# Patient Record
Sex: MALE | Race: WHITE | NOT HISPANIC OR LATINO
[De-identification: names, ages, dates, MRNs, and addresses within clinical notes are randomized per-mention and may not be internally consistent; named-entity substitution may affect disease eponyms.]

---

## 2017-02-21 ENCOUNTER — APPOINTMENT (OUTPATIENT)
Dept: OTOLARYNGOLOGY | Facility: CLINIC | Age: 72
End: 2017-02-21

## 2017-02-21 VITALS
OXYGEN SATURATION: 98 % | TEMPERATURE: 98.1 F | SYSTOLIC BLOOD PRESSURE: 110 MMHG | DIASTOLIC BLOOD PRESSURE: 72 MMHG | HEART RATE: 74 BPM

## 2017-02-21 DIAGNOSIS — J36 PERITONSILLAR ABSCESS: ICD-10-CM

## 2017-05-08 ENCOUNTER — LABORATORY RESULT (OUTPATIENT)
Age: 72
End: 2017-05-08

## 2017-05-08 ENCOUNTER — APPOINTMENT (OUTPATIENT)
Dept: OTOLARYNGOLOGY | Facility: CLINIC | Age: 72
End: 2017-05-08

## 2017-05-08 VITALS
SYSTOLIC BLOOD PRESSURE: 117 MMHG | DIASTOLIC BLOOD PRESSURE: 71 MMHG | HEART RATE: 83 BPM | TEMPERATURE: 99 F | OXYGEN SATURATION: 98 %

## 2017-05-08 DIAGNOSIS — J31.0 CHRONIC RHINITIS: ICD-10-CM

## 2017-05-08 DIAGNOSIS — Z87.09 PERSONAL HISTORY OF OTHER DISEASES OF THE RESPIRATORY SYSTEM: ICD-10-CM

## 2017-05-08 DIAGNOSIS — R05 COUGH: ICD-10-CM

## 2017-05-08 RX ORDER — FLUTICASONE PROPIONATE 50 UG/1
50 SPRAY, METERED NASAL
Qty: 1 | Refills: 3 | Status: ACTIVE | COMMUNITY
Start: 2017-05-08 | End: 1900-01-01

## 2017-05-08 RX ORDER — CALCIUM CARBONATE/VITAMIN D3 600MG-62.5
CAPSULE ORAL
Qty: 2 | Refills: 0 | Status: ACTIVE | COMMUNITY
Start: 2017-05-08 | End: 1900-01-01

## 2017-05-09 ENCOUNTER — LABORATORY RESULT (OUTPATIENT)
Age: 72
End: 2017-05-09

## 2017-11-30 ENCOUNTER — APPOINTMENT (OUTPATIENT)
Dept: OTOLARYNGOLOGY | Facility: CLINIC | Age: 72
End: 2017-11-30
Payer: MEDICARE

## 2017-11-30 VITALS
OXYGEN SATURATION: 99 % | SYSTOLIC BLOOD PRESSURE: 119 MMHG | DIASTOLIC BLOOD PRESSURE: 74 MMHG | HEART RATE: 44 BPM | TEMPERATURE: 98.2 F

## 2017-11-30 PROCEDURE — 69210 REMOVE IMPACTED EAR WAX UNI: CPT

## 2017-11-30 PROCEDURE — 99214 OFFICE O/P EST MOD 30 MIN: CPT | Mod: 25

## 2017-11-30 PROCEDURE — 31575 DIAGNOSTIC LARYNGOSCOPY: CPT

## 2018-02-26 ENCOUNTER — APPOINTMENT (OUTPATIENT)
Dept: OTOLARYNGOLOGY | Facility: CLINIC | Age: 73
End: 2018-02-26

## 2018-03-26 ENCOUNTER — APPOINTMENT (OUTPATIENT)
Dept: OTOLARYNGOLOGY | Facility: CLINIC | Age: 73
End: 2018-03-26
Payer: MEDICARE

## 2018-03-26 VITALS
TEMPERATURE: 97.9 F | SYSTOLIC BLOOD PRESSURE: 120 MMHG | DIASTOLIC BLOOD PRESSURE: 77 MMHG | OXYGEN SATURATION: 100 % | HEART RATE: 61 BPM

## 2018-03-26 VITALS
OXYGEN SATURATION: 100 % | TEMPERATURE: 97.1 F | SYSTOLIC BLOOD PRESSURE: 131 MMHG | DIASTOLIC BLOOD PRESSURE: 76 MMHG | HEART RATE: 77 BPM

## 2018-03-26 PROCEDURE — 92557 COMPREHENSIVE HEARING TEST: CPT

## 2018-03-26 PROCEDURE — 99214 OFFICE O/P EST MOD 30 MIN: CPT | Mod: 25

## 2018-03-26 PROCEDURE — 92567 TYMPANOMETRY: CPT

## 2018-03-26 PROCEDURE — 31231 NASAL ENDOSCOPY DX: CPT

## 2018-04-05 ENCOUNTER — APPOINTMENT (OUTPATIENT)
Dept: OTOLARYNGOLOGY | Facility: CLINIC | Age: 73
End: 2018-04-05
Payer: MEDICARE

## 2018-04-05 VITALS
SYSTOLIC BLOOD PRESSURE: 143 MMHG | DIASTOLIC BLOOD PRESSURE: 81 MMHG | RESPIRATION RATE: 16 BRPM | OXYGEN SATURATION: 99 % | HEART RATE: 72 BPM | TEMPERATURE: 97.6 F

## 2018-04-05 DIAGNOSIS — H90.A32 MIXED CONDUCTIVE AND SENSORINEURAL HEARING, UNILATERAL, LEFT EAR WITH RESTRICTED HEARING ON THE  CONTRALATERAL SIDE: ICD-10-CM

## 2018-04-05 PROCEDURE — 99214 OFFICE O/P EST MOD 30 MIN: CPT | Mod: 25

## 2018-04-05 PROCEDURE — 69420 INCISION OF EARDRUM: CPT

## 2018-04-05 PROCEDURE — 30903 CONTROL OF NOSEBLEED: CPT

## 2018-06-05 ENCOUNTER — APPOINTMENT (OUTPATIENT)
Dept: OTOLARYNGOLOGY | Facility: CLINIC | Age: 73
End: 2018-06-05
Payer: MEDICARE

## 2018-06-05 VITALS
DIASTOLIC BLOOD PRESSURE: 75 MMHG | OXYGEN SATURATION: 98 % | TEMPERATURE: 98 F | SYSTOLIC BLOOD PRESSURE: 121 MMHG | HEART RATE: 74 BPM

## 2018-06-05 DIAGNOSIS — M54.2 CERVICALGIA: ICD-10-CM

## 2018-06-05 DIAGNOSIS — J31.0 CHRONIC RHINITIS: ICD-10-CM

## 2018-06-05 PROCEDURE — 31575 DIAGNOSTIC LARYNGOSCOPY: CPT

## 2018-06-05 PROCEDURE — 92557 COMPREHENSIVE HEARING TEST: CPT

## 2018-06-05 PROCEDURE — 92567 TYMPANOMETRY: CPT

## 2018-06-05 PROCEDURE — 69433 CREATE EARDRUM OPENING: CPT

## 2018-06-05 PROCEDURE — 99214 OFFICE O/P EST MOD 30 MIN: CPT | Mod: 25

## 2018-08-02 ENCOUNTER — APPOINTMENT (OUTPATIENT)
Dept: OTOLARYNGOLOGY | Facility: CLINIC | Age: 73
End: 2018-08-02
Payer: MEDICARE

## 2018-08-02 VITALS
TEMPERATURE: 97.7 F | OXYGEN SATURATION: 99 % | HEART RATE: 74 BPM | SYSTOLIC BLOOD PRESSURE: 122 MMHG | DIASTOLIC BLOOD PRESSURE: 78 MMHG

## 2018-08-02 DIAGNOSIS — D10.6 BENIGN NEOPLASM OF NASOPHARYNX: ICD-10-CM

## 2018-08-02 PROCEDURE — 31575 DIAGNOSTIC LARYNGOSCOPY: CPT

## 2018-08-02 PROCEDURE — 99214 OFFICE O/P EST MOD 30 MIN: CPT | Mod: 25

## 2019-02-07 ENCOUNTER — APPOINTMENT (OUTPATIENT)
Dept: OTOLARYNGOLOGY | Facility: CLINIC | Age: 74
End: 2019-02-07
Payer: MEDICARE

## 2019-02-07 VITALS
OXYGEN SATURATION: 98 % | DIASTOLIC BLOOD PRESSURE: 65 MMHG | HEART RATE: 73 BPM | TEMPERATURE: 97.9 F | SYSTOLIC BLOOD PRESSURE: 122 MMHG | RESPIRATION RATE: 20 BRPM

## 2019-02-07 DIAGNOSIS — H61.23 IMPACTED CERUMEN, BILATERAL: ICD-10-CM

## 2019-02-07 DIAGNOSIS — H91.8X2 OTHER SPECIFIED HEARING LOSS, LEFT EAR: ICD-10-CM

## 2019-02-07 DIAGNOSIS — R49.0 DYSPHONIA: ICD-10-CM

## 2019-02-07 DIAGNOSIS — M43.6 TORTICOLLIS: ICD-10-CM

## 2019-02-07 PROCEDURE — 69200 CLEAR OUTER EAR CANAL: CPT

## 2019-02-07 PROCEDURE — 99214 OFFICE O/P EST MOD 30 MIN: CPT | Mod: 25

## 2019-02-07 PROCEDURE — 31575 DIAGNOSTIC LARYNGOSCOPY: CPT

## 2019-02-07 NOTE — HISTORY OF PRESENT ILLNESS
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 I have been treating him in the past for chronic otitis media. He developed pharyngitis in the past that responded to Augmentin after three days.  He had a drainage of a peritonsillar abscess on 4/10/14 with improvement over the next 2 weeks.  He finished his Augmentin and was given Flagyl and a Medrol dose pack. The cultures from the incision & drainage grew primarily anaerobic bacteria. He was found to have recurrent tonsillitis treated effectively with Flagyl, Augmentin and a Medrol Dosepak in the past.  He returned last month with a new complaint of redness, swelling and pain overlying the tip of the nose for 10 days decreased hearing in the right ear and a throbbing sensation.  He denies odynophagia, dysphagia, fever, chills or night sweats.  Dr. Baudilio jerry Oncologist is following his lymphoma.  His last PET scan showed persistent stable splenomegaly since 2012 along with enlarged lymph nodes throughout the lower chest abdomen and upper pelvis, all unchanged.  There is right sided mastoiditis with low-grade metabolic activity.  Otherwise all of his disease appears to be stable.\par  [FreeTextEntry1] : Dylan returns for follow up after treatment for pharyngitis last year that responded to antibiotics and steroids.  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 He had a T-tube placed AS after myringotomy for recurrent OME.  His hearing had improved and he denies otorrhea or otalgia. He denies fever, chills or night sweats. His lymphoma is stable and being followed by Dr. Astudillo. His weight has been stable and appetite is good.  He has chronic nasal crusting and PND with intermittent epistaxis that is managed with saline irrigations daily and Mupiricin emollients.  He also has had debridement of his nasal crusts on prior visits. He has less posterior neck stiffness, muscle tightness and mild intermittent dull aching pain since going to an exercise class for stretching. He feels better when stretching.

## 2019-02-07 NOTE — PROCEDURE
[Image(s) Captured] : image(s) captured and filed [Gag Reflex] : gag reflex preventing mirror examination [Hoarseness] : hoarseness not clearly evaluated by indirect laryngoscopy [Complicated Symptoms] : complicated symptoms requiring more thorough examination than provided by mirror [Topical Lidocaine] : topical lidocaine [Flexible Endoscope] : examined with the flexible endoscope [Risk and Benefits Discussed] : The purpose, risks, discomforts, benefits and alternatives of the procedure have been explained to the patient including no treatment. [Same] : same as the Pre Op Dx. [Serial Number: ___] : Serial Number: [unfilled] [Cerumen Impaction] : Cerumen Impaction [] : FB Ear Canal [de-identified] : The nasal septum is minimally deviated to the right with much less crust formation.  There are no masses or polyps and the nasal mucosa and secretions are thick on the right but not purulent.  The choanae and posterior nasopharynx are normal without masses. The Eustachian tube orifices appear patent. There are no lesions seen in the nasopharynx today and the palate lesion is stable. The pharynx, including the posterior and lateral pharyngeal walls, the vallecula and base of tongue are normal without ulcerations, lesions or masses. The hypopharynx including the pyriform sinuses open well without pooling of secretions, mucosal lesions or masses. The supraglottic larynx including the epiglottis, petiole, glossoepiglottic folds and pharyngoepiglottic folds are normal without mucosal lesions, ulcerations or masses. The glottis reveals normal false vocal folds. The true vocal folds are glistening white, tense and of equal length, without paralysis, having symmetric mobility on adduction and abduction. There are no mucosal lesions, nodules, cysts, erythroplasia or leukoplakia. The posterior cricoid area has healthy pink mucosa in the interarytenoid area and esophageal inlet. There is minimal thickening/edema of the interarytenoid mucosa suggestive of posterior laryngitis from laryngopharyngeal acid reflux disease. The trachea is clear without narrowing in the immediate subglottic region, without deviation or lesions. \par  [de-identified] : chronic sinusitis/nasopharyngitis, OME AS [FreeTextEntry1] : Hearing loss and bilateral cerumen impaction with extrusion of T-tube AS [FreeTextEntry6] : copious cerumen removed AU with suction and curettes via operative microscope.  Removal of extruded T-tube.

## 2019-02-07 NOTE — REASON FOR VISIT
[FreeTextEntry2] : history of left peritonsillar abscess, chronic splenic marginal zone lymphoma, lesion of nasopharynx, cervical adenopathy and  hearing loss AS. [FreeTextEntry1] : PCP is Allan Segundo M.D., Oncologist is Luz Marina Astudillo MD,  Infectious Disease consultant is Dr. Cintia Martin

## 2019-09-09 ENCOUNTER — APPOINTMENT (OUTPATIENT)
Dept: OTOLARYNGOLOGY | Facility: CLINIC | Age: 74
End: 2019-09-09
Payer: MEDICARE

## 2019-09-09 VITALS
SYSTOLIC BLOOD PRESSURE: 128 MMHG | HEART RATE: 55 BPM | WEIGHT: 145 LBS | OXYGEN SATURATION: 98 % | BODY MASS INDEX: 20.76 KG/M2 | HEIGHT: 70 IN | RESPIRATION RATE: 14 BRPM | DIASTOLIC BLOOD PRESSURE: 62 MMHG | TEMPERATURE: 98.1 F

## 2019-09-09 DIAGNOSIS — R68.89 OTHER GENERAL SYMPTOMS AND SIGNS: ICD-10-CM

## 2019-09-09 DIAGNOSIS — K12.0 RECURRENT ORAL APHTHAE: ICD-10-CM

## 2019-09-09 DIAGNOSIS — K13.79 OTHER LESIONS OF ORAL MUCOSA: ICD-10-CM

## 2019-09-09 DIAGNOSIS — H93.13 TINNITUS, BILATERAL: ICD-10-CM

## 2019-09-09 DIAGNOSIS — H90.A32 MIXED CONDUCTIVE AND SENSORINEURAL HEARING, UNILATERAL, LEFT EAR WITH RESTRICTED HEARING ON THE  CONTRALATERAL SIDE: ICD-10-CM

## 2019-09-09 PROCEDURE — 31575 DIAGNOSTIC LARYNGOSCOPY: CPT

## 2019-09-09 PROCEDURE — 99214 OFFICE O/P EST MOD 30 MIN: CPT | Mod: 25

## 2019-09-09 PROCEDURE — G0268 REMOVAL OF IMPACTED WAX MD: CPT

## 2019-09-09 NOTE — PROCEDURE
[Image(s) Captured] : image(s) captured and filed [Gag Reflex] : gag reflex preventing mirror examination [Hoarseness] : hoarseness not clearly evaluated by indirect laryngoscopy [Complicated Symptoms] : complicated symptoms requiring more thorough examination than provided by mirror [Topical Lidocaine] : topical lidocaine [Flexible Endoscope] : examined with the flexible endoscope [Serial Number: ___] : Serial Number: [unfilled] [Risk and Benefits Discussed] : The purpose, risks, discomforts, benefits and alternatives of the procedure have been explained to the patient including no treatment. [Same] : same as the Pre Op Dx. [Cerumen Impaction] : Cerumen Impaction [] : Binocular Microscopy [de-identified] : The nasal septum is minimally deviated to the right with much less crust formation.  There are no masses or polyps and the nasal mucosa and secretions are thick bilaterally but not purulent.  The choanae and posterior nasopharynx are normal without masses. The Eustachian tube orifices appear patent. There are no lesions seen in the nasopharynx today and the palate lesion is stable. The pharynx, including the posterior and lateral pharyngeal walls, the vallecula and base of tongue are normal without ulcerations, lesions or masses. The hypopharynx including the pyriform sinuses open well without pooling of secretions, mucosal lesions or masses. The supraglottic larynx including the epiglottis, petiole, glossoepiglottic folds and pharyngoepiglottic folds are normal without mucosal lesions, ulcerations or masses. The glottis reveals normal false vocal folds. The true vocal folds are glistening white, tense and of equal length, without paralysis, having symmetric mobility on adduction and abduction. There are no mucosal lesions, nodules, cysts, erythroplasia or leukoplakia. The posterior cricoid area has healthy pink mucosa in the interarytenoid area and esophageal inlet. There is minimal thickening/edema of the interarytenoid mucosa suggestive of posterior laryngitis from laryngopharyngeal acid reflux disease. The trachea is clear without narrowing in the immediate subglottic region, without deviation or lesions. \par  [de-identified] : chronic sinusitis/nasopharyngitis, OME AS [FreeTextEntry1] : Hearing loss and bilateral cerumen impaction. [FreeTextEntry6] : copious cerumen removed AU with suction and curettes. Both TMs are intact and mobile [FreeTextEntry2] : bilateral cerumen impaction and hearing loss

## 2019-09-09 NOTE — HISTORY OF PRESENT ILLNESS
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 I have been treating him in the past for chronic otitis media. He developed pharyngitis in the past that responded to Augmentin after three days.  He had a drainage of a peritonsillar abscess on 4/10/14 with improvement over the next 2 weeks.  He finished his Augmentin and was given Flagyl and a Medrol dose pack. The cultures from the incision & drainage grew primarily anaerobic bacteria. He was found to have recurrent tonsillitis treated effectively with Flagyl, Augmentin and a Medrol Dosepak in the past.  He returned last month with a new complaint of redness, swelling and pain overlying the tip of the nose for 10 days decreased hearing in the right ear and a throbbing sensation.  He denies odynophagia, dysphagia, fever, chills or night sweats.  Dr. Baudilio jerry Oncologist is following his lymphoma.  His last PET scan showed persistent stable splenomegaly since 2012 along with enlarged lymph nodes throughout the lower chest abdomen and upper pelvis, all unchanged.  There is right sided mastoiditis with low-grade metabolic activity.  Otherwise all of his disease appears to be stable.\par  [FreeTextEntry1] : Dylan returns for follow up after treatment for pharyngitis that responded last year to antibiotics and steroids.  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 He had a T-tube placed AS after myringotomy for recurrent OME in the past. The tube had extruded and removed last visit.  He had an intercurrent OM AS treated with Amoxacillin first week this month.  He just finished the antibiotics.  His hearing has improved and he denies otorrhea or otalgia. He denies fever, chills or night sweats. He developed a small ulcer on the right lower lip 3-4 days ago and healing.  He has had many aphthous ulcers.  His lymphoma is stable and being followed by Dr. Astudillo. His weight has been stable and appetite is good.  He has chronic nasal crusting and PND with intermittent epistaxis that is managed with saline irrigations daily and Mupirocin emollients.  He also has had debridement of his nasal crusts on prior visits.

## 2019-12-11 ENCOUNTER — APPOINTMENT (OUTPATIENT)
Dept: OTOLARYNGOLOGY | Facility: CLINIC | Age: 74
End: 2019-12-11
Payer: MEDICARE

## 2019-12-11 VITALS
TEMPERATURE: 98.1 F | SYSTOLIC BLOOD PRESSURE: 115 MMHG | HEART RATE: 82 BPM | HEIGHT: 70 IN | DIASTOLIC BLOOD PRESSURE: 70 MMHG | BODY MASS INDEX: 20.33 KG/M2 | OXYGEN SATURATION: 98 % | WEIGHT: 142 LBS

## 2019-12-11 PROCEDURE — 99214 OFFICE O/P EST MOD 30 MIN: CPT

## 2019-12-11 NOTE — HISTORY OF PRESENT ILLNESS
[de-identified] : 73 yo man with 4 episodes of l otitis media in the pas 3 months. He is concerned in that he is immunosuppressed. He describes profuse left yellow drainage. -f.s.c he has pain which radiates to his left tmj.

## 2019-12-11 NOTE — PHYSICAL EXAM
[de-identified] : r normal; l profuse purulence suctioned. tm appears to have anterosuperior perf [Normal] : palpation of lymph nodes is normal

## 2019-12-16 LAB — BACTERIA SPEC CULT: ABNORMAL

## 2019-12-20 ENCOUNTER — APPOINTMENT (OUTPATIENT)
Dept: OTOLARYNGOLOGY | Facility: CLINIC | Age: 74
End: 2019-12-20
Payer: MEDICARE

## 2019-12-20 VITALS
HEART RATE: 77 BPM | TEMPERATURE: 97.4 F | SYSTOLIC BLOOD PRESSURE: 126 MMHG | OXYGEN SATURATION: 100 % | DIASTOLIC BLOOD PRESSURE: 73 MMHG

## 2019-12-20 PROCEDURE — 99212 OFFICE O/P EST SF 10 MIN: CPT

## 2019-12-21 NOTE — HISTORY OF PRESENT ILLNESS
[de-identified] : followup 75 yo man with left chronic otitis media - took bactrim and floxin as prescribed and has stopped using qtips and kept ear dry and has no further pain or drainage from the ear. He feels his hearing is back to normal as well for a few d,

## 2020-01-08 ENCOUNTER — APPOINTMENT (OUTPATIENT)
Dept: OTOLARYNGOLOGY | Facility: CLINIC | Age: 75
End: 2020-01-08

## 2020-01-30 ENCOUNTER — LABORATORY RESULT (OUTPATIENT)
Age: 75
End: 2020-01-30

## 2020-01-30 ENCOUNTER — APPOINTMENT (OUTPATIENT)
Dept: OTOLARYNGOLOGY | Facility: CLINIC | Age: 75
End: 2020-01-30
Payer: MEDICARE

## 2020-01-30 VITALS
OXYGEN SATURATION: 98 % | HEART RATE: 72 BPM | DIASTOLIC BLOOD PRESSURE: 77 MMHG | SYSTOLIC BLOOD PRESSURE: 126 MMHG | TEMPERATURE: 98.1 F | RESPIRATION RATE: 17 BRPM

## 2020-01-30 PROCEDURE — 99213 OFFICE O/P EST LOW 20 MIN: CPT

## 2020-01-31 NOTE — HISTORY OF PRESENT ILLNESS
[de-identified] : followup 75 yo man with h/o recent ear infxs feel as if they have cleared but he relates recurrent sinusitis for which he irrigates - has had numerous courses of abx and still gets recurrent crusting and bleeding. -f.s/c it is moderae in severity,.

## 2020-01-31 NOTE — PHYSICAL EXAM
[de-identified] : b extensive crusts removed wih alligator forceps [Normal] : no masses and lesions seen, face is symmetric

## 2020-02-04 LAB
ERYTHROCYTE [SEDIMENTATION RATE] IN BLOOD BY WESTERGREN METHOD: 49 MM/HR
MPO AB + PR3 PNL SER: NORMAL

## 2020-03-09 ENCOUNTER — APPOINTMENT (OUTPATIENT)
Dept: OTOLARYNGOLOGY | Facility: CLINIC | Age: 75
End: 2020-03-09
Payer: MEDICARE

## 2020-03-09 VITALS
HEART RATE: 73 BPM | TEMPERATURE: 97.8 F | DIASTOLIC BLOOD PRESSURE: 71 MMHG | SYSTOLIC BLOOD PRESSURE: 116 MMHG | OXYGEN SATURATION: 99 % | RESPIRATION RATE: 15 BRPM

## 2020-03-09 DIAGNOSIS — J32.4 CHRONIC PANSINUSITIS: ICD-10-CM

## 2020-03-09 DIAGNOSIS — R22.1 LOCALIZED SWELLING, MASS AND LUMP, NECK: ICD-10-CM

## 2020-03-09 DIAGNOSIS — J35.01 CHRONIC TONSILLITIS: ICD-10-CM

## 2020-03-09 DIAGNOSIS — J01.01 ACUTE RECURRENT MAXILLARY SINUSITIS: ICD-10-CM

## 2020-03-09 DIAGNOSIS — H66.90 OTITIS MEDIA, UNSPECIFIED, UNSPECIFIED EAR: ICD-10-CM

## 2020-03-09 DIAGNOSIS — H65.22 CHRONIC SEROUS OTITIS MEDIA, LEFT EAR: ICD-10-CM

## 2020-03-09 PROCEDURE — 31575 DIAGNOSTIC LARYNGOSCOPY: CPT

## 2020-03-09 PROCEDURE — 69210 REMOVE IMPACTED EAR WAX UNI: CPT

## 2020-03-09 PROCEDURE — 99214 OFFICE O/P EST MOD 30 MIN: CPT | Mod: 25

## 2020-03-09 NOTE — CONSULT LETTER
[Dear  ___] : Dear  [unfilled], [Consult Letter:] : I had the pleasure of evaluating your patient, [unfilled]. [Please see my note below.] : Please see my note below. [Consult Closing:] : Thank you very much for allowing me to participate in the care of this patient.  If you have any questions, please do not hesitate to contact me. [Sincerely,] : Sincerely, [FreeTextEntry3] : \par Patrick Noguera M.D., FACS, ECNU\par Director Center for Thyroid & Parathyroid Surgery\par [facility]\par Certified in Thyroid/Parathyroid/Neck Ultrasound, ECNU/ AIUM\par \par , Department of Otolaryngology\par [facility] [employer] at [facility]\par

## 2020-03-09 NOTE — PROCEDURE
[Image(s) Captured] : image(s) captured and filed [Gag Reflex] : gag reflex preventing mirror examination [Hoarseness] : hoarseness not clearly evaluated by indirect laryngoscopy [Complicated Symptoms] : complicated symptoms requiring more thorough examination than provided by mirror [Topical Lidocaine] : topical lidocaine [Flexible Endoscope] : examined with the flexible endoscope [Serial Number: ___] : Serial Number: [unfilled] [Risk and Benefits Discussed] : The purpose, risks, discomforts, benefits and alternatives of the procedure have been explained to the patient including no treatment. [Cerumen Impaction] : Cerumen Impaction [Same] : same as the Pre Op Dx. [] : Removal of Cerumen [de-identified] : The nasal septum is minimally deviated to the right with anterior crust formation.  There are no masses or polyps and the nasal mucosa and secretions are thick bilaterally but not purulent.  The choanae and posterior nasopharynx are normal without masses. The Eustachian tube orifices appear patent. There are no lesions seen in the nasopharynx today and the palate lesion is stable. The pharynx, including the posterior and lateral pharyngeal walls, the vallecula and base of tongue are normal without ulcerations, lesions or masses. The hypopharynx including the pyriform sinuses open well without pooling of secretions, mucosal lesions or masses. The supraglottic larynx including the epiglottis, petiole, glossoepiglottic folds and pharyngoepiglottic folds are normal without mucosal lesions, ulcerations or masses. The glottis reveals normal false vocal folds. The true vocal folds are glistening white, tense and of equal length, without paralysis, having symmetric mobility on adduction and abduction. There are no mucosal lesions, nodules, cysts, erythroplasia or leukoplakia. The posterior cricoid area has healthy pink mucosa in the interarytenoid area and esophageal inlet. There is moderate thickening/edema of the interarytenoid mucosa suggestive of posterior laryngitis from laryngopharyngeal acid reflux disease. The trachea is clear without narrowing in the immediate subglottic region, without deviation or lesions. \par  [de-identified] : chronic sinusitis/nasopharyngitis, OME AS [FreeTextEntry1] : Hearing loss and bilateral cerumen impaction. [FreeTextEntry2] : bilateral cerumen impaction and hearing loss [FreeTextEntry6] : copious cerumen removed AU with suction and curettes. Both TMs are intact and mobile

## 2020-03-09 NOTE — HISTORY OF PRESENT ILLNESS
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 I have been treating him in the past for chronic otitis media. He developed pharyngitis in the past that responded to Augmentin after three days.  He had a drainage of a peritonsillar abscess on 4/10/14 with improvement over the next 2 weeks.  He finished his Augmentin and was given Flagyl and a Medrol dose pack. The cultures from the incision & drainage grew primarily anaerobic bacteria. He was found to have recurrent tonsillitis treated effectively with Flagyl, Augmentin and a Medrol Dosepak in the past.  He returned last month with a new complaint of redness, swelling and pain overlying the tip of the nose for 10 days decreased hearing in the right ear and a throbbing sensation.  He denies odynophagia, dysphagia, fever, chills or night sweats.  Dr. Baudilio jerry Oncologist is following his lymphoma.  His last PET scan showed persistent stable splenomegaly since 2012 along with enlarged lymph nodes throughout the lower chest abdomen and upper pelvis, all unchanged.  There is right sided mastoiditis with low-grade metabolic activity.  Otherwise all of his disease appears to be stable.\par  [FreeTextEntry1] : Dylan returns for follow up after treatment for pharyngitis that responded in the past to antibiotics and steroids.  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 He had a T-tube placed AS after myringotomy for recurrent OME in the past. The tube had extruded and removed.  He currently denies otorrhea or otalgia. He denies fever, chills or night sweats. He developed a small ulcer on the right lower lip 3-4 last year that healed.  He has had many aphthous ulcers. He had a peritonsillar abscess drained on the left a few months ago in the ED at [facility].  His lymphoma is stable and being followed by Dr. Astudillo. His weight has been stable and appetite is good. His spleen is slightly enlarged and to discuss splenectomy with Dr. Astudillo.  He has chronic nasal crusting and PND with intermittent epistaxis that is managed with saline irrigations daily and Mupirocin emollients.  He also has had debridement of his nasal crusts on prior visits.

## 2020-06-15 ENCOUNTER — APPOINTMENT (OUTPATIENT)
Dept: OTOLARYNGOLOGY | Facility: CLINIC | Age: 75
End: 2020-06-15

## 2020-11-17 ENCOUNTER — APPOINTMENT (OUTPATIENT)
Dept: OTOLARYNGOLOGY | Facility: CLINIC | Age: 75
End: 2020-11-17
Payer: MEDICARE

## 2020-11-17 VITALS
HEART RATE: 79 BPM | SYSTOLIC BLOOD PRESSURE: 177 MMHG | TEMPERATURE: 97.9 F | OXYGEN SATURATION: 100 % | DIASTOLIC BLOOD PRESSURE: 75 MMHG

## 2020-11-17 DIAGNOSIS — H66.92 OTITIS MEDIA, UNSPECIFIED, LEFT EAR: ICD-10-CM

## 2020-11-17 DIAGNOSIS — H61.23 IMPACTED CERUMEN, BILATERAL: ICD-10-CM

## 2020-11-17 DIAGNOSIS — R09.82 POSTNASAL DRIP: ICD-10-CM

## 2020-11-17 DIAGNOSIS — H69.80 OTHER SPECIFIED DISORDERS OF EUSTACHIAN TUBE, UNSPECIFIED EAR: ICD-10-CM

## 2020-11-17 PROCEDURE — 99214 OFFICE O/P EST MOD 30 MIN: CPT | Mod: 25

## 2020-11-17 PROCEDURE — 69210 REMOVE IMPACTED EAR WAX UNI: CPT

## 2020-11-17 PROCEDURE — 31575 DIAGNOSTIC LARYNGOSCOPY: CPT

## 2020-11-17 NOTE — CONSULT LETTER
[Dear  ___] : Dear  [unfilled], [Consult Letter:] : I had the pleasure of evaluating your patient, [unfilled]. [Please see my note below.] : Please see my note below. [Consult Closing:] : Thank you very much for allowing me to participate in the care of this patient.  If you have any questions, please do not hesitate to contact me. [Sincerely,] : Sincerely, [FreeTextEntry3] : \par Patrick Noguera M.D., FACS, ECNU\par Director Center for Thyroid & Parathyroid Surgery\par [facility]\par Certified in Thyroid/Parathyroid/Neck Ultrasound, ECNU/ AIUM\par \par , Department of Otolaryngology\par [facility] [employer] [facility] at [facility]\par

## 2020-11-17 NOTE — REASON FOR VISIT
[FreeTextEntry2] : history of left peritonsillar abscess, chronic splenic marginal zone lymphoma, lesion of nasopharynx, cervical adenopathy, chronic rhinitis sicca a, crusting and  hearing loss AS. [FreeTextEntry1] : PCP is Allan Segundo M.D., Oncologist is Luz Marina Astudillo MD,  Infectious Disease consultant is Dr. Cintia Martin

## 2020-11-17 NOTE — PROCEDURE
[Image(s) Captured] : image(s) captured and filed [Gag Reflex] : gag reflex preventing mirror examination [Hoarseness] : hoarseness not clearly evaluated by indirect laryngoscopy [Complicated Symptoms] : complicated symptoms requiring more thorough examination than provided by mirror [Topical Lidocaine] : topical lidocaine [Flexible Endoscope] : examined with the flexible endoscope [Serial Number: ___] : Serial Number: [unfilled] [Risk and Benefits Discussed] : The purpose, risks, discomforts, benefits and alternatives of the procedure have been explained to the patient including no treatment. [Cerumen Impaction] : Cerumen Impaction [Same] : same as the Pre Op Dx. [] : Removal of Cerumen [Unable to Cooperate with Mirror] : patient unable to cooperate with mirror [de-identified] : The nasal septum is minimally deviated to the right with extensive bilateral anterior crust formation.  There are no masses or polyps and the nasal mucosa and secretions are thick bilaterally but not purulent.  The choanae and posterior nasopharynx are normal without masses. The Eustachian tube orifices appear patent. There are no lesions seen in the nasopharynx today and the palate lesion is stable. The pharynx, including the posterior and lateral pharyngeal walls, the vallecula and base of tongue are normal without ulcerations, lesions or masses. The hypopharynx including the pyriform sinuses open well without pooling of secretions, mucosal lesions or masses. The supraglottic larynx including the epiglottis, petiole, glossoepiglottic folds and pharyngoepiglottic folds are normal without mucosal lesions, ulcerations or masses. The glottis reveals normal false vocal folds. The true vocal folds are glistening white, tense and of equal length, without paralysis, having symmetric mobility on adduction and abduction. There are no mucosal lesions, nodules, cysts, erythroplasia or leukoplakia. The posterior cricoid area has healthy pink mucosa in the interarytenoid area and esophageal inlet. There is moderate thickening/edema of the interarytenoid mucosa suggestive of posterior laryngitis from laryngopharyngeal acid reflux disease. The trachea is clear without narrowing in the immediate subglottic region, without deviation or lesions. \par  [de-identified] : chronic sinusitis/nasopharyngitis, OME AS [FreeTextEntry1] : Hearing loss and bilateral cerumen impaction. [FreeTextEntry2] : bilateral cerumen impaction and hearing loss [FreeTextEntry6] : copious cerumen removed AU with suction and curettes. Both TMs are intact with slightly decreased mobility AS.

## 2020-11-17 NOTE — HISTORY OF PRESENT ILLNESS
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 I have been treating him in the past for chronic otitis media. He developed pharyngitis in the past that responded to Augmentin after three days.  He had a drainage of a peritonsillar abscess on 4/10/14 with improvement over the next 2 weeks.  He finished his Augmentin and was given Flagyl and a Medrol dose pack. The cultures from the incision & drainage grew primarily anaerobic bacteria. He was found to have recurrent tonsillitis treated effectively with Flagyl, Augmentin and a Medrol Dosepak in the past.  He returned last month with a new complaint of redness, swelling and pain overlying the tip of the nose for 10 days decreased hearing in the right ear and a throbbing sensation.  He denies odynophagia, dysphagia, fever, chills or night sweats.  Dr. Baudilio jerry Oncologist is following his lymphoma.  His last PET scan showed persistent stable splenomegaly since 2012 along with enlarged lymph nodes throughout the lower chest abdomen and upper pelvis, all unchanged.  There is right sided mastoiditis with low-grade metabolic activity.  Otherwise all of his disease appears to be stable.\par  [FreeTextEntry1] : Dylan returns for follow up after treatment for pharyngitis that responded in the past to antibiotics and steroids.  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 He had a T-tube placed AS after myringotomy for recurrent OME in the past. The tube had extruded and removed.  He currently denies otorrhea or otalgia. He denies fever, chills or night sweats. He feels that he may need a hearing aid AS.  He has had many aphthous ulcers. He had a peritonsillar abscess drained on the left last year in the ED at [facility].  His lymphoma is stable and being followed by Dr. Astudillo.  He may eventually have a splenectomy to better manage his lymphoma. His weight has been stable and appetite is good. He has chronic nasal crusting and PND with intermittent epistaxis that is managed with saline irrigations daily and Mupirocin emollients.  He also has had debridement of his nasal crusts on prior visits.

## 2021-01-13 ENCOUNTER — APPOINTMENT (OUTPATIENT)
Dept: OTOLARYNGOLOGY | Facility: CLINIC | Age: 76
End: 2021-01-13
Payer: MEDICARE

## 2021-01-13 PROCEDURE — 99072 ADDL SUPL MATRL&STAF TM PHE: CPT

## 2021-01-13 PROCEDURE — 92550 TYMPANOMETRY & REFLEX THRESH: CPT

## 2021-01-13 PROCEDURE — 92557 COMPREHENSIVE HEARING TEST: CPT

## 2021-12-01 ENCOUNTER — APPOINTMENT (OUTPATIENT)
Dept: OTOLARYNGOLOGY | Facility: CLINIC | Age: 76
End: 2021-12-01
Payer: MEDICARE

## 2021-12-01 VITALS
SYSTOLIC BLOOD PRESSURE: 176 MMHG | OXYGEN SATURATION: 100 % | TEMPERATURE: 97.6 F | HEART RATE: 63 BPM | DIASTOLIC BLOOD PRESSURE: 83 MMHG

## 2021-12-01 DIAGNOSIS — C11.9 MALIGNANT NEOPLASM OF NASOPHARYNX, UNSPECIFIED: ICD-10-CM

## 2021-12-01 DIAGNOSIS — R04.0 EPISTAXIS: ICD-10-CM

## 2021-12-01 DIAGNOSIS — H61.20 IMPACTED CERUMEN, UNSPECIFIED EAR: ICD-10-CM

## 2021-12-01 DIAGNOSIS — J34.89 OTHER SPECIFIED DISORDERS OF NOSE AND NASAL SINUSES: ICD-10-CM

## 2021-12-01 DIAGNOSIS — J31.0 CHRONIC RHINITIS: ICD-10-CM

## 2021-12-01 PROCEDURE — 99215 OFFICE O/P EST HI 40 MIN: CPT | Mod: 25

## 2021-12-01 PROCEDURE — 30901 CONTROL OF NOSEBLEED: CPT

## 2021-12-01 PROCEDURE — 69210 REMOVE IMPACTED EAR WAX UNI: CPT

## 2021-12-01 PROCEDURE — 31575 DIAGNOSTIC LARYNGOSCOPY: CPT

## 2021-12-01 NOTE — HISTORY OF PRESENT ILLNESS
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 I have been treating him in the past for chronic otitis media. He developed pharyngitis in the past that responded to Augmentin after three days.  He had a drainage of a peritonsillar abscess on 4/10/14 with improvement over the next 2 weeks.  He finished his Augmentin and was given Flagyl and a Medrol dose pack. The cultures from the incision & drainage grew primarily anaerobic bacteria. He was found to have recurrent tonsillitis treated effectively with Flagyl, Augmentin and a Medrol Dosepak in the past.  He returned last month with a new complaint of redness, swelling and pain overlying the tip of the nose for 10 days decreased hearing in the right ear and a throbbing sensation.  He denies odynophagia, dysphagia, fever, chills or night sweats.  Dr. Baudilio jerry Oncologist is following his lymphoma.  His last PET scan showed persistent stable splenomegaly since 2012 along with enlarged lymph nodes throughout the lower chest abdomen and upper pelvis, all unchanged.  There is right sided mastoiditis with low-grade metabolic activity.  Otherwise all of his disease appears to be stable.\par  [FreeTextEntry1] : Dylan returns for follow up after treatment for pharyngitis that responded in the past to antibiotics and steroids.  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 He had a T-tube placed AS after myringotomy for recurrent OME in the past. The tube had extruded and was removed.  He currently denies otorrhea or otalgia. He denies fever, chills or night sweats. He feels that he may need a hearing aid AS.  He has had many aphthous ulcers. He had a peritonsillar abscess drained on the left last year in the ED at [facility].  His lymphoma is stable and being followed by Dr. Astudilol.  He may eventually have a splenectomy to better manage his lymphoma. His weight has been stable and appetite is good. He has chronic nasal crusting and PND with intermittent epistaxis that is managed with saline irrigations daily and Mupirocin emollients. He has not had any recent bleeding.  He also has had debridement of his nasal crusts on prior visits. He denies fever, body aches, cough, cyanosis, chest burning, anosmia or recent known COVID exposures.  All family members at home are well. He is fully vaccinated.

## 2021-12-01 NOTE — CONSULT LETTER
[Dear  ___] : Dear  [unfilled], [Consult Letter:] : I had the pleasure of evaluating your patient, [unfilled]. [Please see my note below.] : Please see my note below. [Consult Closing:] : Thank you very much for allowing me to participate in the care of this patient.  If you have any questions, please do not hesitate to contact me. [Sincerely,] : Sincerely, [FreeTextEntry3] : \par Patrick Noguera M.D., FACS, ECNU\par Director Center [facility] Thyroid [facility] Parathyroid Surgery\par [facility]\par Certified in Thyroid/Parathyroid/Neck Ultrasound, ECNU/ AIUM\par \par , Department of Otolaryngology\par [facility] [employer] [facility]\par

## 2021-12-01 NOTE — PROCEDURE
[Image(s) Captured] : image(s) captured and filed [Unable to Cooperate with Mirror] : patient unable to cooperate with mirror [Gag Reflex] : gag reflex preventing mirror examination [Hoarseness] : hoarseness not clearly evaluated by indirect laryngoscopy [Complicated Symptoms] : complicated symptoms requiring more thorough examination than provided by mirror [Topical Lidocaine] : topical lidocaine [Flexible Endoscope] : examined with the flexible endoscope [Serial Number: ___] : Serial Number: [unfilled] [Oxymetazoline HCl] : oxymetazoline HCl [Risk and Benefits Discussed] : The purpose, risks, discomforts, benefits and alternatives of the procedure have been explained to the patient including no treatment. [Cerumen Impaction] : Cerumen Impaction [Same] : same as the Pre Op Dx. [] : Removal of Cerumen [de-identified] : The nasal septum is minimally deviated to the right with extensive anterior crust formation requiring debridement and cauterization.  There are no masses or polyps and the nasal mucosa and secretions are thick bilaterally but not purulent.  The choanae and posterior nasopharynx are normal without masses. The Eustachian tube orifices appear patent. There are no lesions seen in the nasopharynx today and the palate lesion is stable. The pharynx, including the posterior and lateral pharyngeal walls, the vallecula and base of tongue are normal without ulcerations, lesions or masses. The hypopharynx including the pyriform sinuses open well without pooling of secretions, mucosal lesions or masses. The supraglottic larynx including the epiglottis, petiole, glossoepiglottic folds and pharyngoepiglottic folds are normal without mucosal lesions, ulcerations or masses. The glottis reveals normal false vocal folds. The true vocal folds are glistening white, tense and of equal length, without paralysis, having symmetric mobility on adduction and abduction. There are no mucosal lesions, nodules, cysts, erythroplasia or leukoplakia. The posterior cricoid area has healthy pink mucosa in the interarytenoid area and esophageal inlet. There is moderate thickening/edema of the interarytenoid mucosa suggestive of posterior laryngitis from laryngopharyngeal acid reflux disease. The trachea is clear without narrowing in the immediate subglottic region, without deviation or lesions. \par  [de-identified] : chronic sinusitis/nasopharyngitis, OME AS, epistaxis and nasal crusting [FreeTextEntry1] : Hearing loss and bilateral cerumen impaction. [FreeTextEntry2] : bilateral cerumen impaction and hearing loss [FreeTextEntry6] : copious cerumen removed AU with suction and curettes. Both TMs are intact with slightly decreased mobility AS.

## 2022-12-05 ENCOUNTER — APPOINTMENT (OUTPATIENT)
Dept: OTOLARYNGOLOGY | Facility: CLINIC | Age: 77
End: 2022-12-05

## 2023-12-08 ENCOUNTER — TRANSCRIPTION ENCOUNTER (OUTPATIENT)
Age: 78
End: 2023-12-08

## 2024-01-25 ENCOUNTER — RESULT REVIEW (OUTPATIENT)
Age: 79
End: 2024-01-25

## 2024-01-27 ENCOUNTER — TRANSCRIPTION ENCOUNTER (OUTPATIENT)
Age: 79
End: 2024-01-27

## 2024-02-02 ENCOUNTER — RESULT REVIEW (OUTPATIENT)
Age: 79
End: 2024-02-02

## 2024-02-02 ENCOUNTER — APPOINTMENT (OUTPATIENT)
Dept: HEMATOLOGY ONCOLOGY | Facility: CLINIC | Age: 79
End: 2024-02-02
Payer: MEDICARE

## 2024-02-02 VITALS
HEART RATE: 100 BPM | TEMPERATURE: 97.2 F | WEIGHT: 125.19 LBS | OXYGEN SATURATION: 96 % | RESPIRATION RATE: 16 BRPM | DIASTOLIC BLOOD PRESSURE: 62 MMHG | HEIGHT: 70 IN | BODY MASS INDEX: 17.92 KG/M2 | SYSTOLIC BLOOD PRESSURE: 117 MMHG

## 2024-02-02 PROCEDURE — 99205 OFFICE O/P NEW HI 60 MIN: CPT

## 2024-02-02 NOTE — ASSESSMENT
[Reviewed updated] : Reviewed updated [Designated Health Care Proxy] : Designated Health Care Proxy [Name: ___] : Name: [unfilled] [Relationship: ___] : Relationship: [unfilled] [DNR] : DNR [DNI] : DNI [Last Verification Date: ___] : Last Verification Date: [unfilled] [FreeTextEntry1] : Patient with pancytopenia, weight loss, possible lytic lesions, +SPEP/immunofixation concerning for possible progression of splenic Marginal zone lymphoma vs MM.  SPEP:Gamma-Migrating Paraprotein Identified Immunofixation: IgG kappa band identified. Mspike 3.3 FLC RATIO 29.89  Total protein 9.1  CEA normal PSA 1.9   >CBC, CMP, LDH, B2 microglobulin >Pet CT >peripheral flow cytometry >obtain records from Dr. Astudillo >Bone marrow biopsy [AdvancecareDate] : 02/02/2024

## 2024-02-02 NOTE — PHYSICAL EXAM
[Cachectic] : cachectic [Normal] : full range of motion and no deformities appreciated [de-identified] : irregular HR [de-identified] : Stage 1 decub ulcer

## 2024-02-02 NOTE — HISTORY OF PRESENT ILLNESS
[de-identified] : 78M PMH splenic marginal zone lymphoma (treated at [facility]), GERD, A-fib on Eliquis 2.5mg BID, HTN, presenting to the ED with 3 days of back pain from subacute rehab. In the ED, neuro exam was normal, but CT was concerning for possible lumbar spinal fracture as well as possible myeloma vs progression of splenic marginal zone lymphoma.  Follows with Dr. Sharath Astudillo at [facility] for the past 20 years. Has not required any treatment for splenic marginal zone lymphoma - was under watch and wait.  Last seen in 5/2023--> [phone].  He was sent to rehab after a recent admission in 12/2023 for PNA and developed back pain.  Patient denies any other complaints besides back pain.  Has noted a 20-pound weight loss in the past few months due to overall decrease in appetite. No shortness of breath, chest pain, fever, chills, or other acute concerns.   Ct A/P 1/25/24 No evidence of acute aortic syndrome. Resolving infiltrates lung bases. Associated posterior mediastinal and retrocrural adenopathy. Splenomegaly (18cm). Probable retroperitoneal lymphadenopathy Heterogeneous appearance of the osseous structures raises possibility of underlying metastatic disease or myeloma.  Mild compression fracture superior endplate of T12 and L1 likely acute or subacute  [ECOG Performance Status: 1 - Restricted in physically strenuous activity but ambulatory and able to carry out work of a light or sedentary nature] : Performance Status: 1 - Restricted in physically strenuous activity but ambulatory and able to carry out work of a light or sedentary nature, e.g., light house work, office work

## 2024-02-02 NOTE — REVIEW OF SYSTEMS
[Recent Change In Weight] : ~T recent weight change [Loss of Hearing] : loss of hearing [Chest Pain] : chest pain [Diarrhea: Grade 0] : Diarrhea: Grade 0 [Skin Wound] : skin wound [Difficulty Walking] : difficulty walking [Negative] : Heme/Lymph [FreeTextEntry2] : states has lost 4-5 lbs in the last 2 weeks unintentionally; decreased apetite [FreeTextEntry5] : intermittent chest pain r/t to muscles [FreeTextEntry8] : increased urgency [de-identified] : bed sore above buttocks for several weeks [de-identified] : will be getting a walking, feels increased weakness due to double pna

## 2024-02-09 ENCOUNTER — APPOINTMENT (OUTPATIENT)
Dept: HEMATOLOGY ONCOLOGY | Facility: CLINIC | Age: 79
End: 2024-02-09

## 2024-02-13 ENCOUNTER — RESULT REVIEW (OUTPATIENT)
Age: 79
End: 2024-02-13

## 2024-02-14 ENCOUNTER — RESULT REVIEW (OUTPATIENT)
Age: 79
End: 2024-02-14

## 2024-02-17 ENCOUNTER — TRANSCRIPTION ENCOUNTER (OUTPATIENT)
Age: 79
End: 2024-02-17

## 2024-02-19 ENCOUNTER — TRANSCRIPTION ENCOUNTER (OUTPATIENT)
Age: 79
End: 2024-02-19

## 2024-02-23 ENCOUNTER — APPOINTMENT (OUTPATIENT)
Dept: HEMATOLOGY ONCOLOGY | Facility: CLINIC | Age: 79
End: 2024-02-23
Payer: MEDICARE

## 2024-02-23 DIAGNOSIS — R59.1 GENERALIZED ENLARGED LYMPH NODES: ICD-10-CM

## 2024-02-23 DIAGNOSIS — C85.90 NON-HODGKIN LYMPHOMA, UNSPECIFIED, UNSPECIFIED SITE: ICD-10-CM

## 2024-02-23 DIAGNOSIS — R77.8 OTHER SPECIFIED ABNORMALITIES OF PLASMA PROTEINS: ICD-10-CM

## 2024-02-23 PROCEDURE — 99448 NTRPROF PH1/NTRNET/EHR 21-30: CPT

## 2024-02-25 NOTE — HISTORY OF PRESENT ILLNESS
[de-identified] : 78M PMH splenic marginal zone lymphoma (treated at [facility]), GERD, A-fib on Eliquis 2.5mg BID, HTN, presenting to the ED with 3 days of back pain from subacute rehab. In the ED, neuro exam was normal, but CT was concerning for possible lumbar spinal fracture as well as possible myeloma vs progression of splenic marginal zone lymphoma.  Follows with Dr. Sharath Astudillo at [facility] for the past 20 years. Has not required any treatment for splenic marginal zone lymphoma - was under watch and wait.  Last seen in 5/2023--> [phone].  He was sent to rehab after a recent admission in 12/2023 for PNA and developed back pain.  Patient denies any other complaints besides back pain.  Has noted a 20-pound weight loss in the past few months due to overall decrease in appetite. No shortness of breath, chest pain, fever, chills, or other acute concerns.  Ct A/P 1/25/24 No evidence of acute aortic syndrome. Resolving infiltrates lung bases. Associated posterior mediastinal and retrocrural adenopathy. Splenomegaly (18cm). Probable retroperitoneal lymphadenopathy Heterogeneous appearance of the osseous structures raises possibility of underlying metastatic disease or myeloma.  Mild compression fracture superior endplate of T12 and L1 likely acute or subacute   SPEP:Gamma-Migrating Paraprotein Identified Immunofixation: IgG kappa band identified. Mspike 3.3 FLC RATIO 29.89  Total protein 9.1 B2 microglobulin 6.8 PSA 1.9  Bone marrow bx: -Plasma cell neoplasm (80% by IHC , positive CCND1::IGH) Standard risk [de-identified] : Patient was unable to come in due to being in rehab. Visit converted to telehealth. Discussed recent hospital BM bx which showed 80% involvement with plasma cell neoplasm consistent with multiple myeloma. States he has been feeling better since discharge but overall weak. Working with PT in rehab  [ECOG Performance Status: 2 - Ambulatory and capable of all self care but unable to carry out any work activities] : Performance Status: 2 - Ambulatory and capable of all self care but unable to carry out any work activities. Up and about more than 50% of waking hours

## 2024-02-25 NOTE — REVIEW OF SYSTEMS
[FreeTextEntry2] : states has lost 4-5 lbs in the last 2 weeks unintentionally; decreased apetite [FreeTextEntry8] : increased urgency [FreeTextEntry5] : intermittent chest pain r/t to muscles [de-identified] : bed sore above buttocks for several weeks [de-identified] : will be getting a walking, feels increased weakness due to double pna

## 2024-02-25 NOTE — ASSESSMENT
[FreeTextEntry1] : Patient with pancytopenia, weight loss, lytic lesions, +SPEP/immunofixation concerning for possible progression of splenic Marginal zone lymphoma vs MM.  Bone marrow biopsy positive for MM.  Not transplant eligible.  RISS- Stage II (B2 microglobulin >5.4, albumin < 3.5)  Discussed treatment Lenalidomide/Dexamethasone +/- Daratumumab. Would favor triplet therapy however given patient is frail/weak at this time would recommend doublet therapy for the first few cycles.   Discussed side effects which include diarrhea, myelosuppression, thrombosis, viral reactivation (herpes), secondary malignancies (AML).   >at next visit CBC, CMP, immunoglobulins, FLC >plan for doublet therapy with Revlimid/Dex-- information to be provided >start acyclovir 400 mg BID and ASA 81 mg as ppx while on Lenalidomide >patient will need dental clearance prior to starting Xgeva  TREATMENT PLAN: Lenalidomide 25 mg once daily on days 1 to 21 of a 28-day treatment cycle  +  Dexamethasone 40 mg on Day 1, 8, 15, 22  [AdvancecareDate] : 02/02/2024

## 2024-02-27 ENCOUNTER — TRANSCRIPTION ENCOUNTER (OUTPATIENT)
Age: 79
End: 2024-02-27

## 2024-02-28 ENCOUNTER — RESULT REVIEW (OUTPATIENT)
Age: 79
End: 2024-02-28

## 2024-02-28 ENCOUNTER — APPOINTMENT (OUTPATIENT)
Dept: HEMATOLOGY ONCOLOGY | Facility: CLINIC | Age: 79
End: 2024-02-28
Payer: MEDICARE

## 2024-02-28 VITALS
SYSTOLIC BLOOD PRESSURE: 106 MMHG | RESPIRATION RATE: 16 BRPM | WEIGHT: 125 LBS | HEIGHT: 70 IN | HEART RATE: 90 BPM | OXYGEN SATURATION: 96 % | BODY MASS INDEX: 17.9 KG/M2 | TEMPERATURE: 97.2 F | DIASTOLIC BLOOD PRESSURE: 62 MMHG

## 2024-02-28 DIAGNOSIS — C90.00 MULTIPLE MYELOMA NOT HAVING ACHIEVED REMISSION: ICD-10-CM

## 2024-02-28 PROCEDURE — 99215 OFFICE O/P EST HI 40 MIN: CPT

## 2024-02-28 RX ORDER — AMOXICILLIN AND CLAVULANATE POTASSIUM 875; 125 MG/1; MG/1
875-125 TABLET, COATED ORAL
Qty: 20 | Refills: 0 | Status: DISCONTINUED | COMMUNITY
Start: 2020-01-09 | End: 2024-02-28

## 2024-02-28 RX ORDER — LIDOCAINE 40 MG/G
4 PATCH TOPICAL
Refills: 0 | Status: ACTIVE | COMMUNITY
Start: 2024-02-28

## 2024-02-28 RX ORDER — LATANOPROST/PF 0.005 %
0.01 DROPS OPHTHALMIC (EYE)
Qty: 2 | Refills: 0 | Status: DISCONTINUED | COMMUNITY
Start: 2018-10-19 | End: 2024-02-28

## 2024-02-28 RX ORDER — QUETIAPINE FUMARATE 25 MG/1
25 TABLET ORAL
Refills: 0 | Status: ACTIVE | COMMUNITY
Start: 2024-02-28

## 2024-02-28 RX ORDER — POLYETHYLENE GLYCOL 3350 17 G/17G
17 POWDER, FOR SOLUTION ORAL
Refills: 0 | Status: ACTIVE | COMMUNITY
Start: 2024-02-28

## 2024-02-28 RX ORDER — SILVER SULFADIAZINE 10 MG/G
1 CREAM TOPICAL
Refills: 0 | Status: ACTIVE | COMMUNITY
Start: 2024-02-28

## 2024-02-28 RX ORDER — MUPIROCIN 20 MG/G
2 OINTMENT TOPICAL 3 TIMES DAILY
Qty: 13 | Refills: 2 | Status: DISCONTINUED | COMMUNITY
Start: 2020-03-17 | End: 2024-02-28

## 2024-02-28 RX ORDER — SULFAMETHOXAZOLE AND TRIMETHOPRIM 800; 160 MG/1; MG/1
800-160 TABLET ORAL
Qty: 20 | Refills: 0 | Status: DISCONTINUED | COMMUNITY
Start: 2019-12-11 | End: 2024-02-28

## 2024-02-28 RX ORDER — OFLOXACIN OTIC 3 MG/ML
0.3 SOLUTION AURICULAR (OTIC) TWICE DAILY
Qty: 1 | Refills: 1 | Status: DISCONTINUED | COMMUNITY
Start: 2019-12-11 | End: 2024-02-28

## 2024-02-28 RX ORDER — OFLOXACIN OTIC 3 MG/ML
0.3 SOLUTION AURICULAR (OTIC) TWICE DAILY
Qty: 1 | Refills: 3 | Status: DISCONTINUED | COMMUNITY
Start: 2017-05-08 | End: 2024-02-28

## 2024-02-28 RX ORDER — MIRTAZAPINE 7.5 MG/1
7.5 TABLET, FILM COATED ORAL
Refills: 0 | Status: ACTIVE | COMMUNITY
Start: 2024-02-28

## 2024-02-28 RX ORDER — SENNOSIDES 8.6 MG/1
8.6 TABLET ORAL
Refills: 0 | Status: ACTIVE | COMMUNITY
Start: 2024-02-28

## 2024-02-28 RX ORDER — FAMOTIDINE 40 MG/1
40 TABLET, FILM COATED ORAL
Refills: 0 | Status: ACTIVE | COMMUNITY
Start: 2024-02-28

## 2024-02-28 RX ORDER — ACYCLOVIR 50 MG/G
5 CREAM TOPICAL
Qty: 1 | Refills: 1 | Status: DISCONTINUED | COMMUNITY
Start: 2019-09-09 | End: 2024-02-28

## 2024-02-28 RX ORDER — METRONIDAZOLE 500 MG/1
500 TABLET ORAL 3 TIMES DAILY
Qty: 21 | Refills: 0 | Status: DISCONTINUED | COMMUNITY
Start: 2017-05-08 | End: 2024-02-28

## 2024-02-28 RX ORDER — OFLOXACIN OTIC 3 MG/ML
0.3 SOLUTION AURICULAR (OTIC)
Qty: 5 | Refills: 0 | Status: DISCONTINUED | COMMUNITY
Start: 2018-04-05 | End: 2024-02-28

## 2024-02-28 RX ORDER — METOPROLOL TARTRATE 50 MG/1
50 TABLET, FILM COATED ORAL
Refills: 0 | Status: ACTIVE | COMMUNITY
Start: 2024-02-28

## 2024-02-28 RX ORDER — CLINDAMYCIN HYDROCHLORIDE 300 MG/1
300 CAPSULE ORAL EVERY 6 HOURS
Qty: 40 | Refills: 0 | Status: DISCONTINUED | COMMUNITY
Start: 2017-05-08 | End: 2024-02-28

## 2024-02-28 RX ORDER — DIGOXIN 250 UG/1
250 TABLET ORAL
Refills: 0 | Status: ACTIVE | COMMUNITY
Start: 2024-02-28

## 2024-02-28 RX ORDER — APIXABAN 5 MG/1
5 TABLET, FILM COATED ORAL
Refills: 0 | Status: ACTIVE | COMMUNITY
Start: 2024-02-28

## 2024-02-28 RX ORDER — GUAIFENESIN 1200 MG/1
1200 TABLET, EXTENDED RELEASE ORAL
Qty: 28 | Refills: 0 | Status: DISCONTINUED | COMMUNITY
Start: 2017-05-08 | End: 2024-02-28

## 2024-02-28 RX ORDER — MUPIROCIN 20 MG/G
2 OINTMENT TOPICAL 3 TIMES DAILY
Qty: 1 | Refills: 1 | Status: DISCONTINUED | COMMUNITY
Start: 2017-05-08 | End: 2024-02-28

## 2024-02-28 RX ORDER — VANCOMYCIN HYDROCHLORIDE 1.25 G/25ML
1.25 INJECTION, POWDER, LYOPHILIZED, FOR SOLUTION INTRAVENOUS
Refills: 0 | Status: ACTIVE | COMMUNITY
Start: 2024-02-28

## 2024-02-28 RX ORDER — METHYLPREDNISOLONE 4 MG/1
4 TABLET ORAL
Qty: 1 | Refills: 0 | Status: DISCONTINUED | COMMUNITY
Start: 2017-05-08 | End: 2024-02-28

## 2024-02-28 NOTE — REASON FOR VISIT
[Follow-Up Visit] : a follow-up [Spouse] : spouse [Other: _____] : [unfilled] [FreeTextEntry2] : Multiple Myeloma/lymphoma

## 2024-02-28 NOTE — REVIEW OF SYSTEMS
[Loss of Hearing] : loss of hearing [Diarrhea: Grade 0] : Diarrhea: Grade 0 [Chest Pain] : chest pain [Skin Wound] : skin wound [Difficulty Walking] : difficulty walking [Negative] : Heme/Lymph [Fatigue] : fatigue [Recent Change In Weight] : ~T no recent weight change [FreeTextEntry2] : Weight stable [FreeTextEntry8] : increased urgency [de-identified] : bed sore above buttocks for several weeks [de-identified] : wheelchair bound

## 2024-02-28 NOTE — HISTORY OF PRESENT ILLNESS
[ECOG Performance Status: 2 - Ambulatory and capable of all self care but unable to carry out any work activities] : Performance Status: 2 - Ambulatory and capable of all self care but unable to carry out any work activities. Up and about more than 50% of waking hours [de-identified] : 78M PMH splenic marginal zone lymphoma (treated at [place]), GERD, A-fib on Eliquis 2.5mg BID, HTN, presenting to the ED with 3 days of back pain from subacute rehab. In the ED, neuro exam was normal, but CT was concerning for possible lumbar spinal fracture as well as possible myeloma vs progression of splenic marginal zone lymphoma.  Follows with Dr. Sharath Astudillo at [facility] for the past 20 years. Has not required any treatment for splenic marginal zone lymphoma - was under watch and wait.  Last seen in 5/2023--> [phone].  He was sent to rehab after a recent admission in 12/2023 for PNA and developed back pain.  Patient denies any other complaints besides back pain.  Has noted a 20-pound weight loss in the past few months due to overall decrease in appetite. No shortness of breath, chest pain, fever, chills, or other acute concerns.  Ct A/P 1/25/24 No evidence of acute aortic syndrome. Resolving infiltrates lung bases. Associated posterior mediastinal and retrocrural adenopathy. Splenomegaly (18cm). Probable retroperitoneal lymphadenopathy Heterogeneous appearance of the osseous structures raises possibility of underlying metastatic disease or myeloma.  Mild compression fracture superior endplate of T12 and L1 likely acute or subacute   SPEP:Gamma-Migrating Paraprotein Identified Immunofixation: IgG kappa band identified. Mspike 3.3 FLC RATIO 29.89  Total protein 9.1 B2 microglobulin 6.8 PSA 1.9  Bone marrow bx: -Plasma cell neoplasm (80% by IHC , positive CCND1::IGH) Standard risk [de-identified] : The patient presents for follow up of recently diagnosed Multiple Myeloma.  He presents with his wife and daughter, Stephanie.  He continues to be a resident in [facility] in the [place].  He will be transferred to [facility] in [place].  He continues to be weak and was wheelchair bound for this visit.  He has a Stage II decubitus. He denies pain or change in appetite.  His weight is stable.

## 2024-02-28 NOTE — PHYSICAL EXAM
[Cachectic] : cachectic [Capable of only limited self care, confined to bed or chair more than 50% of waking hours] : Status 3- Capable of only limited self care, confined to bed or chair more than 50% of waking hours [Normal] : affect appropriate [de-identified] : irregular HR

## 2024-02-28 NOTE — ASSESSMENT
[Reviewed updated] : Reviewed updated [Designated Health Care Proxy] : Designated Health Care Proxy [Name: ___] : Name: [unfilled] [Relationship: ___] : Relationship: [unfilled] [DNR] : DNR [DNI] : DNI [Last Verification Date: ___] : Last Verification Date: [unfilled] [FreeTextEntry1] : Patient with pancytopenia, weight loss, lytic lesions, +SPEP/immunofixation concerning for possible progression of splenic Marginal zone lymphoma vs MM.  Bone marrow biopsy positive for MM.  Not transplant eligible.  RISS- Stage II (B2 microglobulin >5.4, albumin < 3.5)  Discussed treatment Lenalidomide/Dexamethasone +/- Daratumumab. Would favor triplet therapy however given patient is frail/weak at this time would recommend doublet therapy for the first few cycles.   Discussed side effects which include diarrhea, myelosuppression, thrombosis, viral reactivation (herpes), secondary malignancies (AML).   > CBC, CMP, at each visit, immunoglobulins monthly > CBC, Chem reviewed.  Stable anemia (HgB 8.2) Await immunoglobulins level.  > Revlimid 25 mg ordered to [facility]. Dex ordered through nursing facitlity.  Side effects, toxicity and benefits discussed with pt, wife and daughter. Drug information sheets provided. Family to call when he receives Revlimid.  >Ordered acyclovir 400 mg BID. pt currently on Eliquis so will hold ASA 81 mg as ppx while on Lenalidomide >Discussed making appointment for dental clearance prior to starting Xgeva. Family will try to arrange through nursing facility. Drug information sheet provided on Xgeva.  Discussed side effects including osteonecrosis of the jaw, serum hypocalcemia and proximal hip fracture.   Daughter Stephanie would like to be primary contact phone [phone] Office visit in 2 weeks or prn for new or worsening symptoms.   TREATMENT PLAN: Lenalidomide 25 mg once daily on days 1 to 21 of a 28-day treatment cycle  +  Dexamethasone 40 mg on Day 1, 8, 15, 22  [AdvancecareDate] : 02/02/2024

## 2024-03-07 RX ORDER — LENALIDOMIDE 25 MG/1
25 CAPSULE ORAL
Qty: 21 | Refills: 0 | Status: ACTIVE | COMMUNITY
Start: 2024-02-28

## 2024-03-07 RX ORDER — LENALIDOMIDE 25 MG/1
25 CAPSULE ORAL
Qty: 21 | Refills: 0 | Status: ACTIVE | COMMUNITY
Start: 2024-03-05 | End: 1900-01-01

## 2024-03-07 RX ORDER — LENALIDOMIDE 25 MG/1
25 CAPSULE ORAL
Qty: 21 | Refills: 0 | Status: ACTIVE | COMMUNITY
Start: 2024-03-06 | End: 1900-01-01

## 2024-03-13 ENCOUNTER — APPOINTMENT (OUTPATIENT)
Dept: HEMATOLOGY ONCOLOGY | Facility: CLINIC | Age: 79
End: 2024-03-13

## 2024-03-28 NOTE — HISTORY OF PRESENT ILLNESS
[de-identified] : 78M PMH splenic marginal zone lymphoma (treated at [facility]), GERD, A-fib on Eliquis 2.5mg BID, HTN, presenting to the ED with 3 days of back pain from subacute rehab. In the ED, neuro exam was normal, but CT was concerning for possible lumbar spinal fracture as well as possible myeloma vs progression of splenic marginal zone lymphoma.  Follows with Dr. Sharath Astudillo at [facility] for the past 20 years. Has not required any treatment for splenic marginal zone lymphoma - was under watch and wait.  Last seen in 5/2023--> [phone].  He was sent to rehab after a recent admission in 12/2023 for PNA and developed back pain.  Patient denies any other complaints besides back pain.  Has noted a 20-pound weight loss in the past few months due to overall decrease in appetite. No shortness of breath, chest pain, fever, chills, or other acute concerns.  Ct A/P 1/25/24 No evidence of acute aortic syndrome. Resolving infiltrates lung bases. Associated posterior mediastinal and retrocrural adenopathy. Splenomegaly (18cm). Probable retroperitoneal lymphadenopathy Heterogeneous appearance of the osseous structures raises possibility of underlying metastatic disease or myeloma.  Mild compression fracture superior endplate of T12 and L1 likely acute or subacute   SPEP:Gamma-Migrating Paraprotein Identified Immunofixation: IgG kappa band identified. Mspike 3.3 FLC RATIO 29.89  Total protein 9.1 B2 microglobulin 6.8 PSA 1.9  Bone marrow bx: -Plasma cell neoplasm (80% by IHC , positive CCND1::IGH) Standard risk [de-identified] : The patient presents for follow up of recently diagnosed Multiple Myeloma.  He presents with his wife and daughter, Stephanie.  He continues to be a resident in [facility] in the [place].  He will be transferred to [facility] in [place].  He continues to be weak and was wheelchair bound for this visit.  He has a Stage II decubitus. He denies pain or change in appetite.  His weight is stable.

## 2024-03-28 NOTE — REVIEW OF SYSTEMS
[Recent Change In Weight] : ~T no recent weight change [FreeTextEntry2] : Weight stable [de-identified] : wheelchair bound [de-identified] : bed sore above buttocks for several weeks [FreeTextEntry8] : increased urgency

## 2024-03-28 NOTE — ASSESSMENT
[FreeTextEntry1] : Patient with pancytopenia, weight loss, lytic lesions, +SPEP/immunofixation concerning for possible progression of splenic Marginal zone lymphoma vs MM.  Bone marrow biopsy positive for MM.  Not transplant eligible.  RISS- Stage II (B2 microglobulin >5.4, albumin < 3.5)  Discussed treatment Lenalidomide/Dexamethasone +/- Daratumumab. Would favor triplet therapy however given patient is frail/weak at this time would recommend doublet therapy for the first few cycles.   Discussed side effects which include diarrhea, myelosuppression, thrombosis, viral reactivation (herpes), secondary malignancies (AML).   > CBC, CMP, at each visit, immunoglobulins monthly > CBC, Chem reviewed.  Stable anemia (HgB 8.2) Await immunoglobulins level.  > Revlimid 25 mg ordered to [facility]. Dex ordered through nursing facitlity.  Side effects, toxicity and benefits discussed with pt, wife and daughter. Drug information sheets provided. Family to call when he receives Revlimid.  >Ordered acyclovir 400 mg BID. pt currently on Eliquis so will hold ASA 81 mg as ppx while on Lenalidomide >Discussed making appointment for dental clearance prior to starting Xgeva. Family will try to arrange through nursing facility. Drug information sheet provided on Xgeva.  Discussed side effects including osteonecrosis of the jaw, serum hypocalcemia and proximal hip fracture.   Daughter Stephanie would like to be primary contact phone [phone] Office visit in 2 weeks or prn for new or worsening symptoms.   TREATMENT PLAN: Lenalidomide 25 mg once daily on days 1 to 21 of a 28-day treatment cycle  +  Dexamethasone 40 mg on Day 1, 8, 15, 22  [AdvancecareDate] : 02/02/2024

## 2024-04-03 ENCOUNTER — APPOINTMENT (OUTPATIENT)
Dept: HEMATOLOGY ONCOLOGY | Facility: CLINIC | Age: 79
End: 2024-04-03